# Patient Record
Sex: FEMALE | Race: WHITE | URBAN - METROPOLITAN AREA
[De-identification: names, ages, dates, MRNs, and addresses within clinical notes are randomized per-mention and may not be internally consistent; named-entity substitution may affect disease eponyms.]

---

## 2023-07-13 ENCOUNTER — OFFICE VISIT (OUTPATIENT)
Facility: LOCATION | Age: 38
End: 2023-07-13
Payer: COMMERCIAL

## 2023-07-13 DIAGNOSIS — H04.123 DRY EYE SYNDROME OF BILATERAL LACRIMAL GLANDS: ICD-10-CM

## 2023-07-13 DIAGNOSIS — Z79.899 OTHER LONG TERM (CURRENT) DRUG THERAPY: Primary | ICD-10-CM

## 2023-07-13 DIAGNOSIS — H35.52 PIGMENTARY RETINAL DYSTROPHY: ICD-10-CM

## 2023-07-13 DIAGNOSIS — H16.223 KERATOCONJUNCT SICCA, NOT SPECIFIED AS SJOGREN'S, BILATERAL: ICD-10-CM

## 2023-07-13 PROCEDURE — 92083 EXTENDED VISUAL FIELD XM: CPT | Performed by: OPHTHALMOLOGY

## 2023-07-13 PROCEDURE — 99204 OFFICE O/P NEW MOD 45 MIN: CPT | Performed by: OPHTHALMOLOGY

## 2023-07-13 PROCEDURE — 92134 CPTRZ OPH DX IMG PST SGM RTA: CPT | Performed by: OPHTHALMOLOGY

## 2023-07-13 ASSESSMENT — INTRAOCULAR PRESSURE
OD: 17
OS: 13

## 2023-07-13 ASSESSMENT — KERATOMETRY
OD: 46.56
OS: 46.31

## 2023-07-13 ASSESSMENT — VISUAL ACUITY
OD: 20/20
OS: 20/20

## 2023-07-13 NOTE — IMPRESSION/PLAN
Impression: Dry eye syndrome of bilateral lacrimal glands: H04.123. Plan: Start PFATs QID OU. RTC in 4 weeks for marleny x 2 and ext x 2 with Dr. Brendon Soto.

## 2023-07-13 NOTE — IMPRESSION/PLAN
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 MAC OCT OU done today. Testing shows focal RPE change OD. Plan: Monitor.

## 2023-07-13 NOTE — IMPRESSION/PLAN
Impression: Other long term (current) drug therapy: Z79.899. HVF 10-2 OU done today. Testing shows full fields OU. OCT MAC OU done today. Testing is normal OU.  Plan: Patient to keep all scheduled appointments with rheumatologist.

## 2023-07-13 NOTE — IMPRESSION/PLAN
Impression: Keratoconjunct sicca, not specified as Sjogren's, bilateral: S79.587. Plan: See dry eye plan.